# Patient Record
Sex: FEMALE | Race: WHITE
[De-identification: names, ages, dates, MRNs, and addresses within clinical notes are randomized per-mention and may not be internally consistent; named-entity substitution may affect disease eponyms.]

---

## 2020-10-16 ENCOUNTER — HOSPITAL ENCOUNTER (EMERGENCY)
Dept: HOSPITAL 56 - MW.ED | Age: 20
Discharge: HOME | End: 2020-10-16
Payer: COMMERCIAL
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
## 2020-10-16 DIAGNOSIS — S16.1XXA: Primary | ICD-10-CM

## 2020-10-16 PROCEDURE — 99283 EMERGENCY DEPT VISIT LOW MDM: CPT

## 2020-10-16 NOTE — EDM.PDOC
ED HPI GENERAL MEDICAL PROBLEM





- General


Chief Complaint: General


Stated Complaint: SORE THROAT


Time Seen by Provider: 10/16/20 23:10





- History of Present Illness


INITIAL COMMENTS - FREE TEXT/NARRATIVE: 


HISTORY AND PHYSICAL:





History of present illness:


This is a 20-year-old female who presents ER today secondary to 3 days of 

headache, stiff neck, swelling to her right submandibular region.  Patient 

denies any recent fevers, shakes, chills, nausea, vomiting, diarrhea, dysuria, 

frequency, urgency, chest pain, shortness of breath, cough, URI symptoms.  

Patient reports he does have some mild rhinorrhea.  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 

a dental hygiene student





Review of systems: 


As per history of present illness and below otherwise all systems reviewed and 

negative.





Past medical history: 


As per history of present illness and as reviewed below otherwise 

noncontributory.





Surgical history: 


As per history of present illness and as reviewed below otherwise 

noncontributory.





Social history: 


No reported history of drug or alcohol abuse.





Family history: 


As per history of present illness and as reviewed below otherwise 

noncontributory.





Physical exam:


HEENT: Atraumatic, normocephalic, pupils reactive, negative for conjunctival 

pallor or scleral icterus, mucous membranes moist, throat clear, neck supple, 

nontender, trachea midline.


Lungs: Clear to auscultation, breath sounds equal bilaterally, chest nontender.


Heart: S1S2, regular, negative for clicks, rubs, or JVD.


Abdomen: Soft, nondistended, nontender. Negative for masses or 

hepatosplenomegaly. Negative for costovertebral tenderness.


Pelvis: Stable nontender.


Genitourinary: Deferred.


Rectal: Deferred.


Extremities: Atraumatic, negative for cords or calf pain. Neurovascular 

unremarkable.


Neuro: Awake, alert, oriented. Cranial nerves II through XII unremarkable. 

Cerebellum unremarkable. Motor and sensory unremarkable throughout. Exam 

nonfocal.


Neck supple, no nuchal rigidity, no photophobia, no Kernig's sign or Brudzinski 

sign, patient does not present with signs or symptoms of be consistent with 

meningitis.  Oropharynx clear without erythema or exudates.  Patient does have 

some mild right submandibular tender lymphadenopathy.  TMs intact pearly gray no

bulging.








Assessment and plan:


20-year-old female who presents ER today with stiffness in her neck and headache

for several days.  Patient's presentation does not appear to be consistent with 

meningitis.  Patient's oropharynx was clear with no evidence of pharyngitis.  

Patient's TMs were normal.  Etiology of the patient's symptoms are unclear 

however at this time the patient appears to be stable with no concern for 

significant infection.  I did discuss with the patient the possibility of viral 

infection specifically coronavirus.  Patient reports she does not believe that 

she has coronavirus and declined testing.  Patient be discharged on ibuprofen 

and will follow up with her primary care doctor if symptoms persist.





Definitive disposition and diagnosis as appropriate pending reevaluation and 

review of above.











  ** headache, stiff neck


Pain Score (Numeric/FACES): 7





- Related Data


                                    Allergies











Allergy/AdvReac Type Severity Reaction Status Date / Time


 


No Known Allergies Allergy   Verified 10/16/20 23:03











Home Meds: 


                                    Home Meds





Ibuprofen 600 mg PO Q6HR PRN #30 tablet 10/16/20 [Rx]











Past Medical History


Psychiatric History: Reports: Anxiety





- Past Surgical History


HEENT Surgical History: Reports: Oral Surgery





Social & Family History





- Family History


Family Medical History: Noncontributory





- Tobacco Use


Tobacco Use Status *Q: Never Tobacco User


Second Hand Smoke Exposure: No





- Caffeine Use


Caffeine Use: Reports: None





- Recreational Drug Use


Recreational Drug Use: No





ED ROS GENERAL





- Review of Systems


Review Of Systems: See Below





ED EXAM, GENERAL





- Physical Exam


Exam: See Below





Course





- Vital Signs


Last Recorded V/S: 





                                Last Vital Signs











Temp  97 F   10/16/20 22:49


 


Pulse  78   10/16/20 22:49


 


Resp  17   10/16/20 22:49


 


BP  129/82   10/16/20 22:49


 


Pulse Ox  99   10/16/20 22:49














Departure





- Departure


Time of Disposition: 23:25


Disposition: Home, Self-Care 01


Clinical Impression: 


 Headache, Cervical strain








- Discharge Information


Instructions:  General Headache Without Cause, Muscle Strain, Easy-to-Read


Referrals: 


PCP,Not In Area [Primary Care Provider] - 


Additional Instructions: 


The etiology of your symptoms are unclear but did not appear to be secondary to 

any significant or severe infection.  We are recommending ibuprofen 600 mg every

6 hours for the next 1 to 2 days.  If your symptoms worsen or if you start 

developing fevers please make an appointment to see your family doctor or return

to the ED for reevaluation.





The following information is given to patients seen in the emergency department 

who are being discharged to home. This information is to outline your options 

for follow-up care. We provide all patients seen in our emergency department 

with a follow-up referral.





The need for follow-up, as well as the timing and circumstances, are variable 

depending upon the specifics of your emergency department visit.





If you don't have a primary care physician on staff, we will provide you with a 

referral. We always advise you to contact your personal physician following an 

emergency department visit to inform them of the circumstance of the visit and 

for follow-up with them and/or the need for any referrals to a consulting 

specialist.





The emergency department will also refer you to a specialist when appropriate. 

This referral assures that you have the opportunity for follow-up care with a 

specialist. All of these measure are taken in an effort to provide you with 

optimal care, which includes your follow-up.





Under all circumstances we always encourage you to contact your private 

physician who remains a resource for coordinating your care. When calling for 

follow-up care, please make the office aware that this follow-up is from your 

recent emergency room visit. If for any reason you are refused follow-up, please

contact the [facility] Emergency Department

at [phone] and asked to speak to the emergency department charge nurse.








Sepsis Event Note (ED)





- Evaluation


Sepsis Screening Result: No Definite Risk





- Focused Exam


Vital Signs: 





                                   Vital Signs











  Temp Pulse Resp BP Pulse Ox


 


 10/16/20 22:49  97 F  78  17  129/82  99